# Patient Record
Sex: MALE | Race: WHITE | NOT HISPANIC OR LATINO | Employment: UNEMPLOYED | URBAN - METROPOLITAN AREA
[De-identification: names, ages, dates, MRNs, and addresses within clinical notes are randomized per-mention and may not be internally consistent; named-entity substitution may affect disease eponyms.]

---

## 2019-04-10 ENCOUNTER — HOSPITAL ENCOUNTER (EMERGENCY)
Facility: HOSPITAL | Age: 24
Discharge: HOME | End: 2019-04-10
Attending: FAMILY MEDICINE
Payer: MEDICAID

## 2019-04-10 VITALS
DIASTOLIC BLOOD PRESSURE: 59 MMHG | RESPIRATION RATE: 20 BRPM | TEMPERATURE: 98 F | SYSTOLIC BLOOD PRESSURE: 125 MMHG | BODY MASS INDEX: 17.86 KG/M2 | WEIGHT: 124.75 LBS | OXYGEN SATURATION: 100 % | HEART RATE: 71 BPM | HEIGHT: 70 IN

## 2019-04-10 DIAGNOSIS — S93.601A RIGHT FOOT SPRAIN, INITIAL ENCOUNTER: ICD-10-CM
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
## 2019-04-10 PROCEDURE — 99283 EMERGENCY DEPT VISIT LOW MDM: CPT | Mod: ER

## 2019-04-10 PROCEDURE — 25000003 PHARM REV CODE 250: Mod: ER | Performed by: PHYSICIAN ASSISTANT

## 2019-04-10 RX ORDER — HYDROCODONE BITARTRATE AND ACETAMINOPHEN 5; 325 MG/1; MG/1
1 TABLET ORAL
Status: COMPLETED | OUTPATIENT
Start: 2019-04-10 | End: 2019-04-10

## 2019-04-10 RX ORDER — IBUPROFEN 600 MG/1
600 TABLET ORAL EVERY 8 HOURS PRN
Qty: 21 TABLET | Refills: 0 | Status: SHIPPED | OUTPATIENT
Start: 2019-04-10 | End: 2021-11-03 | Stop reason: ALTCHOICE

## 2019-04-10 RX ORDER — TRAMADOL HYDROCHLORIDE 50 MG/1
50 TABLET ORAL EVERY 6 HOURS PRN
Qty: 15 TABLET | Refills: 0 | Status: SHIPPED | OUTPATIENT
Start: 2019-04-10

## 2019-04-10 RX ADMIN — HYDROCODONE BITARTRATE AND ACETAMINOPHEN 1 TABLET: 5; 325 TABLET ORAL at 04:04

## 2019-04-10 NOTE — DISCHARGE INSTRUCTIONS
Follow up with your PCP for recheck and possible referral to orthopedics if not improving. Return to the ED if worse in any way.

## 2019-04-11 ENCOUNTER — PES CALL (OUTPATIENT)
Dept: ADMINISTRATIVE | Facility: CLINIC | Age: 24
End: 2019-04-11

## 2019-04-11 NOTE — ED PROVIDER NOTES
Encounter Date: 4/10/2019       History     Chief Complaint   Patient presents with    Ankle Pain     PT reports right ankle and foot pain after falling off porch yesterday     Patient is a 23-year-old male presenting with complaint of constant moderate to severe aching pain in the right foot and ankle secondary to falling off the porch at home yesterday.  The pain is worse with movement and weight-bearing.  It does not radiate.  No numbness or focal weakness.  He has been applying ice and taking over-the-counter medication without improvement.        Review of patient's allergies indicates:  No Known Allergies  History reviewed. No pertinent past medical history.  Past Surgical History:   Procedure Laterality Date    CLEFT PALATE REPAIR       History reviewed. No pertinent family history.  Social History     Tobacco Use    Smoking status: Current Every Day Smoker    Smokeless tobacco: Never Used   Substance Use Topics    Alcohol use: Never     Frequency: Never    Drug use: Never     Review of Systems   Constitutional: Negative for activity change, appetite change, chills and fever.   Musculoskeletal:        +right foot pain +swelling   Skin: Negative for wound.   Neurological: Negative for weakness and numbness.   All other systems reviewed and are negative.      Physical Exam     Initial Vitals [04/10/19 1620]   BP Pulse Resp Temp SpO2   107/60 77 20 98.5 °F (36.9 °C) 99 %      MAP       --         Physical Exam    Nursing note and vitals reviewed.  Constitutional: He appears well-developed and well-nourished. He appears distressed (Moderate).   HENT:   Head: Normocephalic and atraumatic.   Eyes: Conjunctivae and EOM are normal.   Neck: Normal range of motion. Neck supple.   Cardiovascular: Normal rate, regular rhythm, normal heart sounds and intact distal pulses.   Pulmonary/Chest: Breath sounds normal. No respiratory distress.   Musculoskeletal:   Moderate swelling to the dorsum of the right foot.   Tenderness to palpation over the 3rd through 5th metatarsals.  Pain with dorsi and plantar flexion of the foot.  Normal range of motion of toes with discomfort.  No bony tenderness in the ankle.  Normal range of motion of ankle without pain.  Good distal pulses.   Neurological: He is alert and oriented to person, place, and time. He has normal strength. No sensory deficit.   Skin: Skin is warm and dry.   No abrasions or lacerations   Psychiatric: He has a normal mood and affect. His behavior is normal. Judgment and thought content normal.         ED Course   Procedures  Labs Reviewed - No data to display       Imaging Results          X-Ray Foot Complete Right (Final result)  Result time 04/10/19 16:53:50    Final result by Roberto Martinez MD (04/10/19 16:53:50)                 Impression:      No abnormality identified.      Electronically signed by: Roberto Martinez  Date:    04/10/2019  Time:    16:53             Narrative:    EXAMINATION:  XR FOOT COMPLETE 3 VIEW RIGHT    CLINICAL HISTORY:  .  Unspecified fall, initial encounter    TECHNIQUE:  AP, lateral and oblique views of the left foot were performed.    COMPARISON:  None    FINDINGS:  No evidence of fracture or dislocation.  Soft tissues unremarkable.  No radiopaque foreign body.  Joint spaces appear well maintained.                                 Medical Decision Making:   Clinical Tests:   Radiological Study: Ordered and Reviewed  No evidence of fracture on x-ray of the right foot.  Ft was wrapped in Ace bandage and patient provided with crutches.  Prescription for ibuprofen and tramadol.  Follow-up with PCP and possible referral to Orthopedics if not improving.  Weightbearing as tolerated.  Return to the ED if worse in anyway                      Clinical Impression:       ICD-10-CM ICD-9-CM   1. Fall as cause of accidental injury at home as place of occurrence W19.XXXA E888.9    Y92.009 E849.0   2. Right foot sprain, initial encounter S93.601A 845.10          Disposition:   Disposition: Discharged                        JAMILAH Gibbs  04/10/19 2050

## 2019-11-11 ENCOUNTER — HOSPITAL ENCOUNTER (EMERGENCY)
Facility: HOSPITAL | Age: 24
Discharge: HOME | End: 2019-11-11
Attending: EMERGENCY MEDICINE
Payer: MEDICAID

## 2019-11-11 VITALS
OXYGEN SATURATION: 99 % | HEART RATE: 97 BPM | DIASTOLIC BLOOD PRESSURE: 84 MMHG | SYSTOLIC BLOOD PRESSURE: 132 MMHG | TEMPERATURE: 98 F | BODY MASS INDEX: 17.75 KG/M2 | HEIGHT: 70 IN | WEIGHT: 124 LBS | RESPIRATION RATE: 18 BRPM

## 2019-11-11 DIAGNOSIS — Z20.2 EXPOSURE TO STD: ICD-10-CM

## 2019-11-11 DIAGNOSIS — N34.2 URETHRITIS: Primary | ICD-10-CM

## 2019-11-11 LAB
BILIRUB UR QL STRIP: NEGATIVE
CLARITY UR: CLEAR
COLOR UR: YELLOW
GLUCOSE UR QL STRIP: NEGATIVE
HGB UR QL STRIP: NEGATIVE
KETONES UR QL STRIP: NEGATIVE
LEUKOCYTE ESTERASE UR QL STRIP: NEGATIVE
NITRITE UR QL STRIP: NEGATIVE
PH UR STRIP: 6 [PH] (ref 5–8)
PROT UR QL STRIP: NEGATIVE
SP GR UR STRIP: <=1.005 (ref 1–1.03)
URN SPEC COLLECT METH UR: ABNORMAL
UROBILINOGEN UR STRIP-ACNC: NEGATIVE EU/DL

## 2019-11-11 PROCEDURE — 87491 CHLMYD TRACH DNA AMP PROBE: CPT

## 2019-11-11 PROCEDURE — 96372 THER/PROPH/DIAG INJ SC/IM: CPT

## 2019-11-11 PROCEDURE — 25000003 PHARM REV CODE 250: Performed by: PHYSICIAN ASSISTANT

## 2019-11-11 PROCEDURE — 63600175 PHARM REV CODE 636 W HCPCS: Performed by: PHYSICIAN ASSISTANT

## 2019-11-11 PROCEDURE — 99284 EMERGENCY DEPT VISIT MOD MDM: CPT | Mod: 25

## 2019-11-11 PROCEDURE — 81003 URINALYSIS AUTO W/O SCOPE: CPT

## 2019-11-11 RX ORDER — AZITHROMYCIN 250 MG/1
1000 TABLET, FILM COATED ORAL
Status: COMPLETED | OUTPATIENT
Start: 2019-11-11 | End: 2019-11-11

## 2019-11-11 RX ORDER — CEFTRIAXONE 500 MG/1
250 INJECTION, POWDER, FOR SOLUTION INTRAMUSCULAR; INTRAVENOUS
Status: COMPLETED | OUTPATIENT
Start: 2019-11-11 | End: 2019-11-11

## 2019-11-11 RX ORDER — ONDANSETRON 4 MG/1
4 TABLET, ORALLY DISINTEGRATING ORAL
Status: COMPLETED | OUTPATIENT
Start: 2019-11-11 | End: 2019-11-11

## 2019-11-11 RX ADMIN — AZITHROMYCIN 1000 MG: 250 TABLET, FILM COATED ORAL at 02:11

## 2019-11-11 RX ADMIN — ONDANSETRON 4 MG: 4 TABLET, ORALLY DISINTEGRATING ORAL at 02:11

## 2019-11-11 RX ADMIN — CEFTRIAXONE SODIUM 250 MG: 500 INJECTION, POWDER, FOR SOLUTION INTRAMUSCULAR; INTRAVENOUS at 02:11

## 2019-11-11 NOTE — ED PROVIDER NOTES
Encounter Date: 11/11/2019    SCRIBE #1 NOTE: I Seniabutch Wilkes, am scribing for, and in the presence of, Esme Oscar PA-C.       History     Chief Complaint   Patient presents with    Exposure to STD     11/11/2019  1:36 PM     The patient is a 24 y.o. male  who presents with burning with urination. Patient c/o acute onset of intermittent burning with urination which started suddenly 3 days ago. Pt also c/o lesion to the penis which appeared 2 days ago. Patient reports associated mild pain. Pt had unprotected sex with a female partner a few days ago. His female partner is going to get checked for STDs because her ex boyfriend was unfaithful. He denies any penile discharge, abdominal pain, vomiting, nausea, fevers, chills or night sweats. Pt reports new onset of redness to the cyst located to the left forehead. Pt has been having the cyst for the past year. He was told to f/u with dermatology but has not made an appointment. No PMHx. No SHx.    The history is provided by the patient and a parent.     Review of patient's allergies indicates:  No Known Allergies  No past medical history on file.  Past Surgical History:   Procedure Laterality Date    CLEFT PALATE REPAIR       No family history on file.  Social History     Tobacco Use    Smoking status: Current Every Day Smoker    Smokeless tobacco: Never Used   Substance Use Topics    Alcohol use: Never     Frequency: Never    Drug use: Never     Review of Systems   Constitutional: Negative for appetite change, chills and fever.   HENT: Negative for congestion, rhinorrhea and sore throat.    Respiratory: Negative for cough and shortness of breath.    Cardiovascular: Negative for chest pain.   Gastrointestinal: Negative for abdominal pain, diarrhea, nausea and vomiting.   Genitourinary: Positive for dysuria and genital sores. Negative for discharge.   Musculoskeletal: Negative for back pain and myalgias.   Skin: Positive for color change (to cyst located to  left forehead). Negative for rash.   Neurological: Negative for weakness and numbness.   Hematological: Does not bruise/bleed easily.       Physical Exam     Initial Vitals [11/11/19 1301]   BP Pulse Resp Temp SpO2   132/84 97 18 98.3 °F (36.8 °C) 99 %      MAP       --         Physical Exam    Nursing note and vitals reviewed.  Constitutional: He appears well-developed and well-nourished. No distress.   HENT:   Head: Atraumatic.   Eyes: EOM are normal. Pupils are equal, round, and reactive to light.   Cardiovascular: Normal rate, regular rhythm and normal pulses.   Pulmonary/Chest: Breath sounds normal. He has no wheezes. He has no rhonchi.   Abdominal: Soft. He exhibits no distension. There is no tenderness.   Genitourinary:   Genitourinary Comments: 0.5 cm skin colored firm papule on the penile shaft without any ulceration or vesicle. Minimal TTP. No additional lesions. No testicular TTP or swelling.   Neurological: He is alert and oriented to person, place, and time.   Skin: Skin is warm. Capillary refill takes less than 2 seconds.   2 cm freely moveable cyst to the left side of the forehead with light overlying erythema. No fluctuance or significant TTP.   Psychiatric: He has a normal mood and affect. His speech is normal.         ED Course   Procedures  Labs Reviewed   URINALYSIS - Abnormal; Notable for the following components:       Result Value    Specific Gravity, UA <=1.005 (*)     All other components within normal limits   C. TRACHOMATIS/N. GONORRHOEAE BY AMP DNA          Imaging Results    None          Medical Decision Making:   History:   Old Medical Records: I decided to obtain old medical records.  Clinical Tests:   Lab Tests: Ordered and Reviewed       APC / Resident Notes:   Patient presents to the ER with chief complaint of dysuria and penile lesion x3 days.  He is concern for possible STD exposure due to unprotected intercourse with a female partner he was with for approximately 1 month.   Patient has small papule to penis, possible genital wart.  Lesion is not consistent with herpetic lesion or syphilis.  I will treat empirically for GC /chlamydia with rocephin and azithromycin.  I have advised follow-up with [facility] for complete STD testing, he is also counseled on wearing protection and to have partner tested and treated for STI as well.  Most also has a small cyst on forehead for 1 year, he notes a small amount of erythema without significant increase in the pain recently.  This is not appear to be an abscess and we do not see an indication for oral antibiotics or I & D at this time.  We have advised follow-up with Dermatology for facial cyst and genital lesion within 1 week.  Patient and parents are comfortable with this plan.  Patient is given ER return precautions.  I discussed the care this patient my supervising physician, the patient was also seen by him.       Scribe Attestation:   Scribe #1: I performed the above scribed service and the documentation accurately describes the services I performed. I attest to the accuracy of the note.    I, Esme Oscar, personally performed the services described in this documentation. All medical record entries made by the scribe were at my direction and in my presence.  I have reviewed the chart and agree that the record reflects my personal performance and is accurate and complete. Esme Oscar, ROLO.  2:37 PM 11/11/2019                        Clinical Impression:       ICD-10-CM ICD-9-CM   1. Urethritis N34.2 597.80   2. Exposure to STD Z20.2 V01.6         Disposition:   Disposition: Discharged  Condition: Stable                     JAMILAH Davenport  11/11/19 1447

## 2019-11-12 LAB
C TRACH DNA SPEC QL NAA+PROBE: NOT DETECTED
N GONORRHOEA DNA SPEC QL NAA+PROBE: NOT DETECTED

## 2020-06-18 VITALS
DIASTOLIC BLOOD PRESSURE: 88 MMHG | HEART RATE: 74 BPM | OXYGEN SATURATION: 100 % | TEMPERATURE: 99 F | RESPIRATION RATE: 14 BRPM | BODY MASS INDEX: 19.6 KG/M2 | SYSTOLIC BLOOD PRESSURE: 123 MMHG | WEIGHT: 140 LBS | HEIGHT: 71 IN

## 2020-06-18 PROCEDURE — 99283 EMERGENCY DEPT VISIT LOW MDM: CPT | Mod: 25

## 2020-06-19 ENCOUNTER — HOSPITAL ENCOUNTER (EMERGENCY)
Facility: HOSPITAL | Age: 25
Discharge: HOME | End: 2020-06-19
Attending: EMERGENCY MEDICINE
Payer: MEDICAID

## 2020-06-19 DIAGNOSIS — S90.30XA CONTUSION OF FOOT, UNSPECIFIED LATERALITY, INITIAL ENCOUNTER: ICD-10-CM

## 2020-06-19 DIAGNOSIS — M79.673 FOOT PAIN: ICD-10-CM

## 2020-06-19 DIAGNOSIS — M79.671 FOOT PAIN, RIGHT: Primary | ICD-10-CM

## 2020-06-19 RX ORDER — OXYCODONE AND ACETAMINOPHEN 5; 325 MG/1; MG/1
2 TABLET ORAL
Status: DISCONTINUED | OUTPATIENT
Start: 2020-06-19 | End: 2020-06-19 | Stop reason: HOSPADM

## 2020-06-19 RX ORDER — NAPROXEN 500 MG/1
500 TABLET ORAL 2 TIMES DAILY WITH MEALS
Qty: 30 TABLET | Refills: 0 | Status: SHIPPED | OUTPATIENT
Start: 2020-06-19

## 2020-06-19 NOTE — ED PROVIDER NOTES
"Encounter Date: 6/18/2020       History     Chief Complaint   Patient presents with    Foot Pain     "hurt right foot falling down steps at home"      24-year-old male presented to emergency department with right foot pain.  Patient said he fell and landed on his right foot falling down the steps at home.  Patient complains of pain in the right foot in the midfoot area while he was trying to walk.  Denies any other complaints and denies any other injuries.        Review of patient's allergies indicates:  No Known Allergies  No past medical history on file.  Past Surgical History:   Procedure Laterality Date    CLEFT PALATE REPAIR       No family history on file.  Social History     Tobacco Use    Smoking status: Current Every Day Smoker    Smokeless tobacco: Never Used   Substance Use Topics    Alcohol use: Never     Frequency: Never    Drug use: Never     Review of Systems   Constitutional: Negative.    HENT: Negative.    Eyes: Negative.    Respiratory: Negative.    Cardiovascular: Negative.    Gastrointestinal: Negative.    Endocrine: Negative.    Genitourinary: Negative.    Musculoskeletal:        Right foot pain and swelling and tenderness   Skin: Negative.    Allergic/Immunologic: Negative.    Neurological: Negative.    Hematological: Negative.    Psychiatric/Behavioral: Negative.    All other systems reviewed and are negative.      Physical Exam     Initial Vitals [06/18/20 2326]   BP Pulse Resp Temp SpO2   123/88 74 14 98.8 °F (37.1 °C) 100 %      MAP       --         Physical Exam    Nursing note and vitals reviewed.  Constitutional: He appears well-developed and well-nourished.   HENT:   Head: Normocephalic and atraumatic.   Nose: Nose normal.   Eyes: Conjunctivae and EOM are normal.   Neck: Normal range of motion. No tracheal deviation present.   Cardiovascular: Normal rate, regular rhythm, normal heart sounds and intact distal pulses. Exam reveals no friction rub.    No murmur " heard.  Pulmonary/Chest: Breath sounds normal. No respiratory distress. He has no wheezes. He has no rales.   Abdominal: Soft. He exhibits no distension. There is no abdominal tenderness.   Musculoskeletal: Normal range of motion. Tenderness present.      Comments: Right foot pain and tenderness and mild swelling diffusely in the midfoot area.  Extremity neurovascularly intact.   Neurological: He is alert and oriented to person, place, and time. He has normal strength.   Skin: Skin is warm and dry. Capillary refill takes less than 2 seconds.   Psychiatric: He has a normal mood and affect. Thought content normal.         ED Course   Procedures  Labs Reviewed - No data to display       Imaging Results          X-Ray Foot Complete Right (Preliminary result)  Result time 06/19/20 00:49:55    ED Interpretation by Nabila Crabtree MD (06/19/20 00:49:55, [facility], Emergency Medicine)    Normal                            X-Rays:   Independently Interpreted Readings:   Other Readings:  X-ray of the right foot does not show any obvious fractures or displaced fractures    Medical Decision Making:   Differential Diagnosis:   Patient with the right foot pain from a fall.  Patient was placed in a walking boot and advised not to bear weight.  Extremities neuro bili intact after would place pain managed.  Discharged with be pain.  Discharged with instructions and follow-up.  Patient advised to get a repeat x-ray of the foot if pain is persistent in 1 week.  Return precautions given  Clinical Tests:   Radiological Study: Reviewed                                 Clinical Impression:       ICD-10-CM ICD-9-CM   1. Foot pain, right  M79.671 729.5   2. Foot pain  M79.673 729.5   3. Contusion of foot, unspecified laterality, initial encounter  S90.30XA 924.20             ED Disposition Condition    Discharge Stable        ED Prescriptions     Medication Sig Dispense Start Date End Date Auth. Provider    naproxen (NAPROSYN) 500  MG tablet Take 1 tablet (500 mg total) by mouth 2 (two) times daily with meals. 30 tablet 6/19/2020  Nabila Crabtree MD        Follow-up Information     Follow up With Specialties Details Why Contact Info    [facility]  In 2 days  [address] [address]  [phone]                                       Nabila Crabtree MD  06/19/20 0101

## 2020-09-17 ENCOUNTER — HOSPITAL ENCOUNTER (EMERGENCY)
Facility: HOSPITAL | Age: 25
Discharge: HOME | End: 2020-09-17
Attending: EMERGENCY MEDICINE
Payer: MEDICAID

## 2020-09-17 VITALS
HEART RATE: 83 BPM | TEMPERATURE: 98 F | SYSTOLIC BLOOD PRESSURE: 127 MMHG | RESPIRATION RATE: 20 BRPM | DIASTOLIC BLOOD PRESSURE: 95 MMHG | OXYGEN SATURATION: 100 % | BODY MASS INDEX: 20.3 KG/M2 | HEIGHT: 71 IN | WEIGHT: 145 LBS

## 2020-09-17 DIAGNOSIS — L72.3 SEBACEOUS CYST: Primary | ICD-10-CM

## 2020-09-17 PROCEDURE — 99282 EMERGENCY DEPT VISIT SF MDM: CPT

## 2020-09-17 NOTE — ED PROVIDER NOTES
"Encounter Date: 9/17/2020    SCRIBE #1 NOTE: I, Alla Barlow, am scribing for, and in the presence of, Michael Buckner MD.       History     Chief Complaint   Patient presents with    Cyst     States he has had a cyst on his forehead for 2 years and its " just time to get it removed."       Time seen by provider: 10:43 AM on 09/17/2020    Orville Guaman is a 25 y.o. male who presents to the ED for evaluation of gradual enlargement of a cyst to the left forehead x2 years. Patient states that he is looking to get it removed. No headache or syncope. No eye pain or vision changes. The patient denies fever, N/V, or any other symptoms at this time. Hx of cleft palate with corrective surgery.     The history is provided by the patient.     Review of patient's allergies indicates:  No Known Allergies  History reviewed. No pertinent past medical history.  Past Surgical History:   Procedure Laterality Date    CLEFT PALATE REPAIR       History reviewed. No pertinent family history.  Social History     Tobacco Use    Smoking status: Current Every Day Smoker     Packs/day: 0.50     Types: Cigarettes    Smokeless tobacco: Never Used   Substance Use Topics    Alcohol use: Never     Frequency: Never    Drug use: Never     Review of Systems   Constitutional: Negative for fever.   HENT: Negative for facial swelling.    Eyes: Negative for pain and visual disturbance.   Cardiovascular: Negative for chest pain.   Gastrointestinal: Negative for nausea and vomiting.   Musculoskeletal: Negative for myalgias.   Skin: Positive for wound. Negative for color change.   Neurological: Negative for syncope, light-headedness and headaches.   Hematological: Does not bruise/bleed easily.   Psychiatric/Behavioral: Negative for confusion.       Physical Exam     Initial Vitals [09/17/20 1017]   BP Pulse Resp Temp SpO2   (!) 127/95 83 20 98.2 °F (36.8 °C) 100 %      MAP       --         Physical Exam    Nursing note and vitals " reviewed.  Constitutional: He appears well-developed and well-nourished. He is not diaphoretic. No distress.   HENT:   Head: Normocephalic and atraumatic.       Mouth/Throat: Oropharynx is clear and moist.   1cm minimally tender, non-erythematous, rubbery, mobile left frontal cyst. No fluctuance. No drainage. No overlying skin changes.   Eyes: Conjunctivae are normal.   Neck: Neck supple.   Cardiovascular: Normal rate, regular rhythm, normal heart sounds and intact distal pulses. Exam reveals no gallop and no friction rub.    No murmur heard.  Pulmonary/Chest: Breath sounds normal. He has no wheezes. He has no rhonchi. He has no rales.   Musculoskeletal: Normal range of motion.   Neurological: He is alert and oriented to person, place, and time.   Skin: No rash noted. No erythema.         ED Course   Procedures  Labs Reviewed - No data to display       Imaging Results    None          Medical Decision Making:   History:   Old Medical Records: I decided to obtain old medical records.            Scribe Attestation:   Scribe #1: I performed the above scribed service and the documentation accurately describes the services I performed. I attest to the accuracy of the note.    I, Dr. Michael Buckner, personally performed the services described in this documentation. All medical record entries made by the scribe were at my direction and in my presence.  I have reviewed the chart and agree that the record reflects my personal performance and is accurate and complete. Michael Buckner MD.  11:13 AM 09/17/2020    Orville Guaman is a 25 y.o. male presenting with chronic forehead epidermoid appearing cyst.  Referral to Dermatology or plastics given.  No sign of abscess or infectious process.  No emergent treatment in the emergency department indicated.  I do not think antibiotics are indicated.  Return precautions reviewed.                  Clinical Impression:     ICD-10-CM ICD-9-CM   1. Sebaceous cyst  L72.3 706.2                       Disposition:   Disposition: Discharged  Condition: Stable     ED Disposition Condition    Discharge Stable        ED Prescriptions     None        Follow-up Information     Follow up With Specialties Details Why Contact Info    Angelic Hidalgo MD Dermatology  Dermatology, next week [address] [place] [address]  [phone]      Arie Hudson MD Plastic Surgery  Alternative plastic surgery [facility]  [place] [address]  [phone]                                         Michael Buckner MD  09/17/20 1113

## 2020-09-18 ENCOUNTER — PATIENT OUTREACH (OUTPATIENT)
Dept: EMERGENCY MEDICINE | Facility: HOSPITAL | Age: 25
End: 2020-09-18

## 2020-09-18 NOTE — PROGRESS NOTES
Education provided on services provided.  Patient declined enrollment but wanted resources to food singh. Provided Patient with resources.

## 2021-03-03 ENCOUNTER — HOSPITAL ENCOUNTER (EMERGENCY)
Facility: HOSPITAL | Age: 26
Discharge: HOME | End: 2021-03-03
Attending: EMERGENCY MEDICINE
Payer: MEDICAID

## 2021-03-03 VITALS
TEMPERATURE: 98 F | WEIGHT: 135 LBS | OXYGEN SATURATION: 99 % | SYSTOLIC BLOOD PRESSURE: 132 MMHG | DIASTOLIC BLOOD PRESSURE: 75 MMHG | HEART RATE: 90 BPM | HEIGHT: 71 IN | BODY MASS INDEX: 18.9 KG/M2 | RESPIRATION RATE: 20 BRPM

## 2021-03-03 DIAGNOSIS — K04.01 ACUTE PULPITIS: Primary | ICD-10-CM

## 2021-03-03 PROCEDURE — 25000003 PHARM REV CODE 250: Performed by: EMERGENCY MEDICINE

## 2021-03-03 PROCEDURE — 99284 EMERGENCY DEPT VISIT MOD MDM: CPT

## 2021-03-03 RX ORDER — AMOXICILLIN AND CLAVULANATE POTASSIUM 875; 125 MG/1; MG/1
1 TABLET, FILM COATED ORAL
Status: COMPLETED | OUTPATIENT
Start: 2021-03-03 | End: 2021-03-03

## 2021-03-03 RX ORDER — KETOROLAC TROMETHAMINE 10 MG/1
10 TABLET, FILM COATED ORAL
Status: COMPLETED | OUTPATIENT
Start: 2021-03-03 | End: 2021-03-03

## 2021-03-03 RX ORDER — KETOROLAC TROMETHAMINE 10 MG/1
10 TABLET, FILM COATED ORAL EVERY 8 HOURS PRN
Qty: 12 TABLET | Refills: 0 | Status: SHIPPED | OUTPATIENT
Start: 2021-03-03 | End: 2021-11-03 | Stop reason: SDUPTHER

## 2021-03-03 RX ORDER — AMOXICILLIN AND CLAVULANATE POTASSIUM 875; 125 MG/1; MG/1
1 TABLET, FILM COATED ORAL 2 TIMES DAILY
Qty: 14 TABLET | Refills: 0 | Status: SHIPPED | OUTPATIENT
Start: 2021-03-03 | End: 2021-03-10

## 2021-03-03 RX ADMIN — AMOXICILLIN AND CLAVULANATE POTASSIUM 1 TABLET: 875; 125 TABLET, FILM COATED ORAL at 01:03

## 2021-03-03 RX ADMIN — KETOROLAC TROMETHAMINE 10 MG: 10 TABLET, FILM COATED ORAL at 01:03

## 2021-06-06 ENCOUNTER — HOSPITAL ENCOUNTER (EMERGENCY)
Facility: HOSPITAL | Age: 26
Discharge: HOME | End: 2021-06-06
Attending: EMERGENCY MEDICINE
Payer: MEDICAID

## 2021-06-06 VITALS
BODY MASS INDEX: 20.83 KG/M2 | SYSTOLIC BLOOD PRESSURE: 141 MMHG | OXYGEN SATURATION: 100 % | DIASTOLIC BLOOD PRESSURE: 77 MMHG | WEIGHT: 145.5 LBS | TEMPERATURE: 99 F | RESPIRATION RATE: 18 BRPM | HEART RATE: 53 BPM | HEIGHT: 70 IN

## 2021-06-06 DIAGNOSIS — K04.7 DENTAL ABSCESS: Primary | ICD-10-CM

## 2021-06-06 PROCEDURE — 99283 EMERGENCY DEPT VISIT LOW MDM: CPT

## 2021-06-06 RX ORDER — PENICILLIN V POTASSIUM 500 MG/1
500 TABLET, FILM COATED ORAL 4 TIMES DAILY
Qty: 40 TABLET | Refills: 0 | Status: SHIPPED | OUTPATIENT
Start: 2021-06-06 | End: 2021-06-16

## 2021-11-03 ENCOUNTER — HOSPITAL ENCOUNTER (EMERGENCY)
Facility: HOSPITAL | Age: 26
Discharge: HOME | End: 2021-11-03
Attending: EMERGENCY MEDICINE
Payer: MEDICAID

## 2021-11-03 VITALS
DIASTOLIC BLOOD PRESSURE: 59 MMHG | OXYGEN SATURATION: 99 % | HEIGHT: 71 IN | TEMPERATURE: 98 F | WEIGHT: 145 LBS | SYSTOLIC BLOOD PRESSURE: 117 MMHG | RESPIRATION RATE: 19 BRPM | HEART RATE: 84 BPM | BODY MASS INDEX: 20.3 KG/M2

## 2021-11-03 DIAGNOSIS — S60.222A CONTUSION OF LEFT HAND, INITIAL ENCOUNTER: Primary | ICD-10-CM

## 2021-11-03 PROCEDURE — 99283 EMERGENCY DEPT VISIT LOW MDM: CPT | Mod: 25

## 2021-11-03 PROCEDURE — 29125 APPL SHORT ARM SPLINT STATIC: CPT | Mod: LT

## 2021-11-03 PROCEDURE — 25000003 PHARM REV CODE 250: Performed by: EMERGENCY MEDICINE

## 2021-11-03 RX ORDER — KETOROLAC TROMETHAMINE 10 MG/1
10 TABLET, FILM COATED ORAL EVERY 8 HOURS PRN
Qty: 9 TABLET | Refills: 0 | Status: SHIPPED | OUTPATIENT
Start: 2021-11-03 | End: 2021-11-06

## 2021-11-03 RX ORDER — KETOROLAC TROMETHAMINE 10 MG/1
10 TABLET, FILM COATED ORAL
Status: COMPLETED | OUTPATIENT
Start: 2021-11-03 | End: 2021-11-03

## 2021-11-03 RX ADMIN — KETOROLAC TROMETHAMINE 10 MG: 10 TABLET, FILM COATED ORAL at 11:11
